# Patient Record
Sex: MALE | Race: WHITE | NOT HISPANIC OR LATINO | Employment: PART TIME | URBAN - METROPOLITAN AREA
[De-identification: names, ages, dates, MRNs, and addresses within clinical notes are randomized per-mention and may not be internally consistent; named-entity substitution may affect disease eponyms.]

---

## 2021-07-07 ENCOUNTER — NON-PROVIDER VISIT (OUTPATIENT)
Dept: URGENT CARE | Facility: PHYSICIAN GROUP | Age: 26
End: 2021-07-07

## 2021-07-07 PROCEDURE — 8907 PR URINE COLLECT ONLY: Performed by: PHYSICIAN ASSISTANT

## 2022-03-28 ENCOUNTER — NON-PROVIDER VISIT (OUTPATIENT)
Dept: URGENT CARE | Facility: PHYSICIAN GROUP | Age: 27
End: 2022-03-28

## 2022-03-28 PROCEDURE — 8907 PR URINE COLLECT ONLY: Performed by: FAMILY MEDICINE

## 2023-07-31 ENCOUNTER — OFFICE VISIT (OUTPATIENT)
Dept: URGENT CARE | Facility: CLINIC | Age: 28
End: 2023-07-31

## 2023-07-31 DIAGNOSIS — Z02.1 PRE-EMPLOYMENT DRUG SCREENING: ICD-10-CM

## 2023-08-10 ENCOUNTER — NON-PROVIDER VISIT (OUTPATIENT)
Dept: URGENT CARE | Facility: CLINIC | Age: 28
End: 2023-08-10

## 2023-08-10 ENCOUNTER — OCCUPATIONAL MEDICINE (OUTPATIENT)
Dept: URGENT CARE | Facility: CLINIC | Age: 28
End: 2023-08-10
Payer: COMMERCIAL

## 2023-08-10 VITALS
DIASTOLIC BLOOD PRESSURE: 101 MMHG | HEART RATE: 88 BPM | BODY MASS INDEX: 34.36 KG/M2 | RESPIRATION RATE: 14 BRPM | SYSTOLIC BLOOD PRESSURE: 150 MMHG | TEMPERATURE: 97.1 F | WEIGHT: 240 LBS | OXYGEN SATURATION: 99 % | HEIGHT: 70 IN

## 2023-08-10 DIAGNOSIS — Z02.1 PRE-EMPLOYMENT DRUG SCREENING: ICD-10-CM

## 2023-08-10 DIAGNOSIS — J45.20 MILD INTERMITTENT ASTHMA WITHOUT COMPLICATION: ICD-10-CM

## 2023-08-10 DIAGNOSIS — Z77.098 OCCUPATIONAL EXPOSURE TO CHEMICALS: ICD-10-CM

## 2023-08-10 PROBLEM — R45.851 SUICIDAL IDEATION: Status: RESOLVED | Noted: 2023-03-28 | Resolved: 2023-08-10

## 2023-08-10 PROBLEM — R45.851 SUICIDAL IDEATION: Status: ACTIVE | Noted: 2023-03-28

## 2023-08-10 PROCEDURE — 3080F DIAST BP >= 90 MM HG: CPT | Performed by: NURSE PRACTITIONER

## 2023-08-10 PROCEDURE — 8907 PR URINE COLLECT ONLY: Performed by: NURSE PRACTITIONER

## 2023-08-10 PROCEDURE — 3077F SYST BP >= 140 MM HG: CPT | Performed by: NURSE PRACTITIONER

## 2023-08-10 PROCEDURE — 99202 OFFICE O/P NEW SF 15 MIN: CPT | Performed by: NURSE PRACTITIONER

## 2023-08-10 PROCEDURE — 1125F AMNT PAIN NOTED PAIN PRSNT: CPT | Performed by: NURSE PRACTITIONER

## 2023-08-10 RX ORDER — AMLODIPINE BESYLATE 10 MG/1
TABLET ORAL
COMMUNITY
Start: 2023-08-01 | End: 2023-08-10

## 2023-08-10 RX ORDER — ALBUTEROL SULFATE 90 UG/1
2 AEROSOL, METERED RESPIRATORY (INHALATION) EVERY 6 HOURS PRN
COMMUNITY

## 2023-08-10 RX ORDER — AMLODIPINE BESYLATE 10 MG/1
10 TABLET ORAL DAILY
COMMUNITY

## 2023-08-10 ASSESSMENT — ENCOUNTER SYMPTOMS
CHILLS: 0
COUGH: 0
FEVER: 0
HEMOPTYSIS: 0
WHEEZING: 0
SPUTUM PRODUCTION: 0

## 2023-08-10 ASSESSMENT — PAIN SCALES - GENERAL: PAINLEVEL: 5=MODERATE PAIN

## 2023-08-10 ASSESSMENT — FIBROSIS 4 INDEX: FIB4 SCORE: 0.49

## 2023-08-10 NOTE — PROGRESS NOTES
"Subjective     Ney Hurley is a 28 y.o. male who presents with Work-Related Injury (Hx of asthma, inhaled sulfuric acid )      DOI 8-9-23; 1st visit.  Ney was at work yesterday.  He was working in an area that had a heavy chemical smell.  He and another co-worker began experiencing symptoms.  He reports that he had a scratchy throat, watery eyes, nasal congestion, and chest tightness.  He and the co-worker noted a spilled liquid on the floor, which was determined to be sulphuric acid. They exited the area and the spill was cleaned.  He reports that he continued to have some chest tightness throughout the day.  He has mild intermittent asthma and typically uses albuterol once or twice a year for acute symptoms.  He used his albuterol yesterday afternoon and felt good relief of the chest tightness.  Today he reports improved symptoms with a mild scratchy throat.  NO chest pain, wheezing or cough.  He has not used his albuterol today.  He does have an elevated blood pressure in clinic, which he attributes to not taking his amlodipine today.  He has been taking it for 4-5 years with good management of hypertension.       HPI    Review of Systems   Constitutional:  Negative for chills, fever and malaise/fatigue.   Respiratory:  Negative for cough, hemoptysis, sputum production and wheezing.    Cardiovascular:  Negative for chest pain.     Medications, Allergies, and current problem list reviewed today in Epic         Objective     Blood Pressure (Abnormal) 150/101   Pulse 88   Temperature 36.2 °C (97.1 °F)   Respiration 14   Height 1.778 m (5' 10\")   Weight 109 kg (240 lb)   Oxygen Saturation 99%   Body Mass Index 34.44 kg/m²      Physical Exam    Ney is alert, oriented and in no acute distress.  Blood pressure 150/101.  Heart rate 88.  Respiratory rate 14.  O2 sat 99%.  Afebrile.  BMI 33.44.  No diaphoresis.  Heart RRR, S1, S2.  Lungs clear to auscultation.  Able to maintain normal flow of conversation " without becoming winded.  No weakness.  No eye watering or nasal congestion.                     Assessment & Plan        1. Occupational exposure to chemicals      2. Mild intermittent asthma without complication    Discussed exam findings with Ney.  Symptoms likely limited to short term, resolved exposure to respiratory irritant. Differential reviewed.  Full duty work.   Follow up in 1 week, sooner if worse.  ED precautions reviewed.  He verbalized understanding of and agreed with plan of care.

## 2023-08-10 NOTE — LETTER
[facility]  [address] [facility] [address] [place] [address]  Phone:  [phone] - Fax:  [phone]   [facility] Progress Report and Disability Certification  Date of Service: 8/10/2023   No Show:  No  Date / Time of Next Visit: 8/17/2023   Claim Information   Patient Name: Ney Hurley  Claim Number:     Employer: [employer]  Date of Injury: 8/9/2023     Insurer / TPA: [employer]  ID / SSN:     Occupation:   Diagnosis: Diagnoses of Occupational exposure to chemicals and Mild intermittent asthma without complication were pertinent to this visit.    Medical Information   Related to Industrial Injury? Yes    Subjective Complaints:  DOI 8-9-23; 1st visit.  Ney was at work yesterday.  He was working in an area that had a heavy chemical smell.  He and another co-worker began experiencing symptoms.  He reports that he had a scratchy throat, watery eyes, nasal congestion, and chest tightness.  He and the co-worker noted a spilled liquid on the floor, which was determined to be sulphuric acid. They exited the area and the spill was cleaned.  He reports that he continued to have some chest tightness throughout the day.  He has mild intermittent asthma and typically uses albuterol once or twice a year for acute symptoms.  He used his albuterol yesterday afternoon and felt good relief of the chest tightness.  Today he reports improved symptoms with a mild scratchy throat.  NO chest pain, wheezing or cough.  He has not used his albuterol today.  He does have an elevated blood pressure in clinic, which he attributes to not taking his amlodipine today.  He has been taking it for 4-5 years with good management of hypertension.     Objective Findings: Ney is alert, oriented and in no acute distress.  Blood pressure 150/101.  Heart rate 88.  Respiratory rate 14.  O2 sat 99%.  Afebrile.  BMI 33.44.  No diaphoresis.  Heart RRR, S1, S2.   Lungs clear to auscultation.  Able to maintain normal flow of conversation without becoming winded.  No weakness.  No eye watering or nasal congestion.     Pre-Existing Condition(s):     Assessment:   Initial Visit    Status: Additional Care Required  Comments:Follow up in 1 week, sooner if worse.  Permanent Disability:No    Plan:      Diagnostics:      Comments:  Ney has baseline mild intermittent asthma that is well controlled.      Disability Information   Status: Released to Full Duty    From:  8/10/2023  Through: 8/17/2023 Restrictions are:     Physical Restrictions   Sitting:    Standing:    Stooping:    Bending:      Squatting:    Walking:    Climbing:    Pushing:      Pulling:    Other:    Reaching Above Shoulder (L):   Reaching Above Shoulder (R):       Reaching Below Shoulder (L):    Reaching Below Shoulder (R):      Not to exceed Weight Limits   Carrying(hrs):   Weight Limit(lb):   Lifting(hrs):   Weight  Limit(lb):     Comments:      Repetitive Actions   Hands: i.e. Fine Manipulations from Grasping:     Feet: i.e. Operating Foot Controls:     Driving / Operate Machinery:     Health Care Provider’s Original or Electronic Signature  RODOLFO Vargas. Health Care Provider’s Original or Electronic Signature    Silver Brown DO MPH     Clinic Name / Location: [address], [address]  [place] [address] Clinic Phone Number: Dept: [phone]   Appointment Time: 8:30 Am Visit Start Time: 8:43 AM   Check-In Time:  8:38 Am Visit Discharge Time:  9:07 AM   Original-Treating Physician or Chiropractor    Page 2-Insurer/TPA    Page 3-Employer    Page 4-Employee

## 2023-08-10 NOTE — LETTER
"EMPLOYEE’S CLAIM FOR COMPENSATION/ REPORT OF INITIAL TREATMENT  FORM C-4  PLEASE TYPE OR PRINT    EMPLOYEE’S CLAIM - PROVIDE ALL INFORMATION REQUESTED   First Name  Ney Last Name  Xavi Birthdate                    1995                Sex  male Claim Number (Insurer’s Use Only)   Home Address  [address] Age  28 y.o. Height  1.778 m (5' 10\") Weight  109 kg (240 lb) [facility] Zip  [address] Telephone  [phone] (home)    Mailing Address  [address] [facility] Zip  [address] Primary Language Spoken  English    INSURER  [employer]av[employer] THIRD-PARTY     Javon Kurtz   Employee's Occupation (Job Title) When Injury or Occupational Disease Occurred      Employer's Name/Company Name  [employer]  Telephone  [phone]    Office Mail Address (Number and Street)  [address]        Date of Injury  8/9/2023               Hours Injury  3:00 PM Date Employer Notified  8/9/2023 Last Day of Work after Injury or Occupational Disease  8/9/2023 Supervisor to Whom Injury     Reported  Pravin Swartz   Address or Location of Accident (if applicable)  Work [1]   What were you doing at the time of accident? (if applicable)   [place] on floor    How did this injury or occupational disease occur? (Be specific and answer in detail. Use additional sheet if necessary)  Exiting chemical on floor where we were working   If you believe that you have an occupational disease, when did you first have knowledge of the disability and its relationship to your employment?   Witnesses to the Accident (if applicable)  Daniele Coleman      Nature of Injury or Occupational Disease  Workers' Compensation  Part(s) of Body Injured or Affected  Lungs, Eye (L), Eye (R)    I CERTIFY THAT THE ABOVE IS TRUE AND CORRECT TO T HE BEST OF MY KNOWLEDGE AND THAT I " HAVE PROVIDED THIS INFORMATION IN ORDER TO OBTAIN THE BENEFITS OF [place], OR [place]).  I HEREBY AUTHORIZE ANY PHYSICIAN, CHIROPRACTOR, SURGEON, PRACTITIONER OR ANY OTHER PERSON, ANY HOSPITAL, INCLUDING [facility] OR [facility], ANY  MEDICAL SERVICE ORGANIZATION, ANY INSURANCE COMPANY, OR OTHER INSTITUTION OR ORGANIZATION TO RELEASE TO EACH OTHER, ANY MEDICAL OR OTHER INFORMATION, INCLUDING BENEFITS PAID OR PAYABLE, PERTINENT TO THIS INJURY OR DISEASE, EXCEPT INFORMATION RELATIVE TO DIAGNOSIS, TREATMENT AND/OR COUNSELING FOR AIDS, PSYCHOLOGICAL CONDITIONS, ALCOHOL OR CONTROLLED SUBSTANCES, FOR WHICH I MUST GIVE SPECIFIC AUTHORIZATION.  A PHOTOSTAT OF THIS AUTHORIZATION SHALL BE VALID AS THE ORIGINAL.       Date 8/10/23   Place Replaced by [facility] Employee’s Original or  *Electronic Signature   THIS REPORT MUST BE COMPLETED AND MAILED WITHIN 3 WORKING DAYS OF TREATMENT   Place  [facility]  Name of Facility  [facility]   Date  8/10/2023 Diagnosis and Description of Injury or Occupational Disease  (Z77.098) Occupational exposure to chemicals  (J45.20) Mild intermittent asthma without complication Is there evidence that the injured employee was under the influence of alcohol and/or another controlled substance at the time of accident?  ? No ? Yes (if yes, please explain)   Hour  8:43 AM   Diagnoses of Occupational exposure to chemicals and Mild intermittent asthma without complication were pertinent to this visit. No   Treatment  No further acute treatment indicated at this time.    Have you advised the patient to remain off work five days or     more?    X-Ray Findings      ? Yes Indicate dates:   From   To      From information given by the employee, together with medical evidence, can        you directly connect this injury or occupational disease as job incurred?  Yes ? No If no, is  "the injured employee capable of:  ? full duty  Yes ? modified duty      Is additional medical care by a physician indicated?  Yes  Comments:Follow up in 1 week, sooner if worse. If modified duty, specify any limitations / restrictions      Do you know of any previous injury or disease contributing to this condition or occupational disease?  ? Yes ? No (Explain if yes)                          No  Comments:Baseline asthma, typically intermittent and well controlled.   Date  8/10/2023 Print Health Care Provider's  Name  LUIS ENRIQUE Vargas I certify that the employer’s copy of  this form was delivered to the employer on:   Address  [address], [address] Insurer’s Use Only     [facility] Zip  [address]    Provider’s Tax ID Number  [id number] Telephone  Dept: [phone]             Health Care Provider’s Original or Electronic Signature  e-KARISSA Rogel Degree (MD,DO, DC,PA-C,APRN)  APRN      * Complete and attach Release of Information (Form C-4A) when injured employee signs C-4 Form electronically  ORIGINAL - TREATING HEALTHCARE PROVIDER PAGE 2 - INSURER/TPA PAGE 3 - EMPLOYER PAGE 4 - EMPLOYEE             Form C-4 (rev.08/21)           BRIEF DESCRIPTION OF RIGHTS AND BENEFITS  (Pursuant to [place] 616[place])    Notice of Injury or Occupational Disease (Incident Report Form C-1): If an injury or occupational disease (OD) arises out of and in the course of employment, you must provide written notice to your employer as soon as practicable, but no later than 7 days after the accident or OD. Your employer shall maintain a sufficient supply of the required forms.    Claim for Compensation (Form C-4): If medical treatment is sought, the form C-4 is available at the place of initial treatment. A completed \"Claim for Compensation\" (Form C-4) must be filed within 90 days after an accident or OD. The treating physician or chiropractor must, within 3 working days after treatment, complete and " mail to the employer, the employer's insurer and third-party , the Claim for Compensation.    Medical Treatment: If you require medical treatment for your on-the-job injury or OD, you may be required to select a physician or chiropractor from a list provided by your workers’ compensation insurer, if it has contracted with an Organization for Managed Care (MCO) or Preferred Provider Organization (PPO) or providers of health care. If your employer has not entered into a contract with an MCO or PPO, you may select a physician or chiropractor from the Panel of Physicians and Chiropractors. Any medical costs related to your industrial injury or OD will be paid by your insurer.    Temporary Total Disability (TTD): If your doctor has certified that you are unable to work for a period of at least 5 consecutive days, or 5 cumulative days in a 20-day period, or places restrictions on you that your employer does not accommodate, you may be entitled to TTD compensation.    Temporary Partial Disability (TPD): If the wage you receive upon reemployment is less than the compensation for TTD to which you are entitled, the insurer may be required to pay you TPD compensation to make up the difference. TPD can only be paid for a maximum of 24 months.    Permanent Partial Disability (PPD): When your medical condition is stable and there is an indication of a PPD as a result of your injury or OD, within 30 days, your insurer must arrange for an evaluation by a rating physician or chiropractor to determine the degree of your PPD. The amount of your PPD award depends on the date of injury, the results of the PPD evaluation, your age and wage.    Permanent Total Disability (PTD): If you are medically certified by a treating physician or chiropractor as permanently and totally disabled and have been granted a PTD status by your insurer, you are entitled to receive monthly benefits not to exceed 66 2/3% of your average monthly  wage. The amount of your PTD payments is subject to reduction if you previously received a lump-sum PPD award.    Vocational Rehabilitation Services: You may be eligible for vocational rehabilitation services if you are unable to return to the job due to a permanent physical impairment or permanent restrictions as a result of your injury or occupational disease.    Transportation and Per Jared Reimbursement: You may be eligible for travel expenses and per jared associated with medical treatment.    Reopening: You may be able to reopen your claim if your condition worsens after claim closure.     Appeal Process: If you disagree with a written determination issued by the insurer or the insurer does not respond to your request, you may appeal to the Department of Administration, , by following the instructions contained in your determination letter. You must appeal the determination within 70 days from the date of the determination letter at [address], [address], [place], [place] [address], or [address], [address], [place] [address]. If you disagree with the  decision, you may appeal to the Department of Administration, . You must file your appeal within 30 days from the date of the  decision letter at [address], [address], [place], [place] [address], or [address], [address], [place] [address]. If you disagree with a decision of an , you may file a petition for judicial review with the District Court. You must do so within 30 days of the Appeal Officer’s decision. You may be represented by an  at your own expense or you may contact the [facility] for possible representation.    [place]  for Injured Workers (NAIW): If you disagree with a  decision, you may request that NAIW represent you without charge at an  Hearing. For information regarding denial of  benefits, you may contact the [facility] at: [address], [address], [place] [address], [phone], or [address], [address], [place] [address], [phone]    To File a Complaint with the Division: If you wish to file a complaint with the  of the Division of Industrial Relations (DIR),  please contact the Workers’ Compensation Section, [address], [address], [place], [place] [address], telephone (038) [phone], or [address], [address], [place], [place] [address], telephone [phone]) [phone].    For assistance with Workers’ Compensation Issues: You may contact the [facility] Office for Consumer Health Assistance, [address], [address], [place], [place] [address], Toll Free 1-750.692.6879, Web site: http://[facility].nv.[facility]/Programs/SHEREE E-mail: [email or web address]              __________________________________________________________________                                    _____8/10/23____________            Employee Name / Signature                                                                                                                            Date                                                                                                                                                                                                                              D-2 (rev. 10/20)